# Patient Record
Sex: MALE | Race: WHITE | NOT HISPANIC OR LATINO | URBAN - METROPOLITAN AREA
[De-identification: names, ages, dates, MRNs, and addresses within clinical notes are randomized per-mention and may not be internally consistent; named-entity substitution may affect disease eponyms.]

---

## 2017-10-05 ASSESSMENT — MIFFLIN-ST. JEOR: SCORE: 1745.04

## 2017-10-06 ENCOUNTER — SURGERY - HEALTHEAST (OUTPATIENT)
Dept: SURGERY | Facility: CLINIC | Age: 72
End: 2017-10-06

## 2017-10-06 ENCOUNTER — ANESTHESIA - HEALTHEAST (OUTPATIENT)
Dept: SURGERY | Facility: CLINIC | Age: 72
End: 2017-10-06

## 2017-10-06 ENCOUNTER — HOME CARE/HOSPICE - HEALTHEAST (OUTPATIENT)
Dept: HOME HEALTH SERVICES | Facility: HOME HEALTH | Age: 72
End: 2017-10-06

## 2017-10-06 ASSESSMENT — MIFFLIN-ST. JEOR: SCORE: 1831.23

## 2017-10-09 ASSESSMENT — MIFFLIN-ST. JEOR: SCORE: 1724.63

## 2017-10-10 ASSESSMENT — MIFFLIN-ST. JEOR: SCORE: 1736.88

## 2021-05-31 VITALS — HEIGHT: 71 IN | WEIGHT: 218.2 LBS | BODY MASS INDEX: 30.55 KG/M2

## 2021-06-13 NOTE — ANESTHESIA POSTPROCEDURE EVALUATION
Patient: Mega Mackenzie  IRRIGATION AND DEBRIDEMENT LEFT HIP  Anesthesia type: general    Patient location: PACU  Last vitals:   Vitals:    10/06/17 1450   BP: 156/70   Pulse: 100   Resp: 21   Temp:    SpO2: 100%     Post vital signs: stable  Level of consciousness: awake and responds to simple questions  Post-anesthesia pain: pain controlled  Post-anesthesia nausea and vomiting: no  Pulmonary: unassisted, return to baseline  Cardiovascular: stable and blood pressure at baseline  Hydration: adequate  Anesthetic events: no    QCDR Measures:  ASA# 11 - Elidia-op Cardiac Arrest: ASA11B - Patient did NOT experience unanticipated cardiac arrest  ASA# 12 - Elidia-op Mortality Rate: ASA12B - Patient did NOT die  ASA# 13 - PACU Re-Intubation Rate: ASA13B - Patient did NOT require a new airway mgmt  ASA# 10 - Composite Anes Safety: ASA10A - No serious adverse event    Additional Notes:

## 2021-06-13 NOTE — ANESTHESIA CARE TRANSFER NOTE
Last vitals:   Vitals:    10/06/17 1400   BP: 157/72   Pulse: 88   Resp: 16   Temp: 37.2  C (98.9  F)   SpO2: 99%     Patient's level of consciousness is drowsy  Spontaneous respirations: yes  Maintains airway independently: yes  Dentition unchanged: yes  Oropharynx: oropharynx clear of all foreign objects    QCDR Measures:  ASA# 20 - Surgical Safety Checklist: WHO surgical safety checklist completed prior to induction  PQRS# 430 - Adult PONV Prevention: [id number]-[id number] - Medical reason for NOT administering combination therapy  ASA# 8 - Peds PONV Prevention: NA - Not pediatric patient, not GA or 2 or more risk factors NOT present  PQRS# 424 - Elidia-op Temp Management: 4559F - At least one body temp DOCUMENTED => 35.5C or 95.9F within required timeframe  PQRS# 426 - PACU Transfer Protocol: - Transfer of care checklist used  ASA# 14 - Acute Post-op Pain: ASA14B - Patient did NOT experience pain >= 7 out of 10

## 2021-06-13 NOTE — ANESTHESIA PREPROCEDURE EVALUATION
Anesthesia Evaluation      Patient summary reviewed     Airway   Mallampati: I  Neck ROM: full   Pulmonary - normal exam    breath sounds clear to auscultation  (+) asthma  mild,  well controlled,                          Cardiovascular - negative ROS and normal exam  (+) hypertension well controlled, ,     Rhythm: regular  Rate: normal,         Neuro/Psych - negative ROS     Endo/Other    (+) diabetes mellitus type 2, arthritis, obesity,      GI/Hepatic/Renal    (+) GERD well controlled,             Dental    (+) caps                       Anesthesia Plan  Planned anesthetic: spinal    ASA 3     Anesthetic plan and risks discussed with: patient    Post-op plan: routine recovery

## 2021-06-16 PROBLEM — Z79.4 CONTROLLED TYPE 2 DIABETES MELLITUS WITHOUT COMPLICATION, WITH LONG-TERM CURRENT USE OF INSULIN (H): Status: ACTIVE | Noted: 2017-10-06

## 2021-06-16 PROBLEM — E11.9 CONTROLLED TYPE 2 DIABETES MELLITUS WITHOUT COMPLICATION, WITH LONG-TERM CURRENT USE OF INSULIN (H): Status: ACTIVE | Noted: 2017-10-06

## 2021-06-16 PROBLEM — M00.9 SEPTIC ARTHRITIS OF HIP (H): Status: ACTIVE | Noted: 2017-10-06

## 2021-06-16 PROBLEM — M25.559 HIP PAIN: Status: ACTIVE | Noted: 2017-10-05

## 2021-06-16 PROBLEM — M11.20 PSEUDOGOUT: Status: ACTIVE | Noted: 2017-10-09

## 2021-06-16 PROBLEM — N40.1 BENIGN PROSTATIC HYPERPLASIA WITH LOWER URINARY TRACT SYMPTOMS, SYMPTOM DETAILS UNSPECIFIED: Status: ACTIVE | Noted: 2017-10-06

## 2021-06-16 PROBLEM — M00.9: Status: ACTIVE | Noted: 2017-10-06

## 2021-06-16 PROBLEM — I10 ESSENTIAL HYPERTENSION: Status: ACTIVE | Noted: 2017-10-06
